# Patient Record
Sex: FEMALE | Race: WHITE | NOT HISPANIC OR LATINO | URBAN - METROPOLITAN AREA
[De-identification: names, ages, dates, MRNs, and addresses within clinical notes are randomized per-mention and may not be internally consistent; named-entity substitution may affect disease eponyms.]

---

## 2020-07-16 ENCOUNTER — OUTPATIENT (OUTPATIENT)
Dept: OUTPATIENT SERVICES | Facility: HOSPITAL | Age: 45
LOS: 1 days | Discharge: HOME | End: 2020-07-16

## 2020-07-16 DIAGNOSIS — I69.00 UNSPECIFIED SEQUELAE OF NONTRAUMATIC SUBARACHNOID HEMORRHAGE: ICD-10-CM

## 2020-07-16 DIAGNOSIS — I69.351 HEMIPLEGIA AND HEMIPARESIS FOLLOWING CEREBRAL INFARCTION AFFECTING RIGHT DOMINANT SIDE: ICD-10-CM

## 2020-07-16 DIAGNOSIS — R26.89 OTHER ABNORMALITIES OF GAIT AND MOBILITY: ICD-10-CM

## 2020-07-31 PROBLEM — Z00.00 ENCOUNTER FOR PREVENTIVE HEALTH EXAMINATION: Status: ACTIVE | Noted: 2020-07-31

## 2020-08-04 ENCOUNTER — APPOINTMENT (OUTPATIENT)
Dept: NEUROLOGY | Facility: CLINIC | Age: 45
End: 2020-08-04
Payer: COMMERCIAL

## 2020-08-04 VITALS
OXYGEN SATURATION: 98 % | TEMPERATURE: 98.4 F | WEIGHT: 202 LBS | BODY MASS INDEX: 37.17 KG/M2 | HEART RATE: 62 BPM | HEIGHT: 62 IN | DIASTOLIC BLOOD PRESSURE: 81 MMHG | SYSTOLIC BLOOD PRESSURE: 134 MMHG

## 2020-08-04 DIAGNOSIS — Z82.3 FAMILY HISTORY OF STROKE: ICD-10-CM

## 2020-08-04 DIAGNOSIS — Z82.49 FAMILY HISTORY OF ISCHEMIC HEART DISEASE AND OTHER DISEASES OF THE CIRCULATORY SYSTEM: ICD-10-CM

## 2020-08-04 DIAGNOSIS — Z78.9 OTHER SPECIFIED HEALTH STATUS: ICD-10-CM

## 2020-08-04 PROCEDURE — 99204 OFFICE O/P NEW MOD 45 MIN: CPT

## 2020-08-04 RX ORDER — ESCITALOPRAM OXALATE 10 MG/1
10 TABLET, FILM COATED ORAL
Refills: 0 | Status: ACTIVE | COMMUNITY

## 2020-08-04 RX ORDER — FLUTICASONE PROPIONATE AND SALMETEROL 50; 250 UG/1; UG/1
250-50 POWDER RESPIRATORY (INHALATION)
Refills: 0 | Status: ACTIVE | COMMUNITY

## 2020-08-04 RX ORDER — LABETALOL HYDROCHLORIDE 200 MG/1
200 TABLET, FILM COATED ORAL TWICE DAILY
Refills: 0 | Status: ACTIVE | COMMUNITY

## 2020-08-04 RX ORDER — TAMSULOSIN HYDROCHLORIDE 0.4 MG/1
0.4 CAPSULE ORAL
Refills: 0 | Status: DISCONTINUED | COMMUNITY

## 2020-08-04 NOTE — REVIEW OF SYSTEMS
[Recent Weight Loss (___ Lbs)] : recent [unfilled] ~Ulb weight loss [Difficulty with Language] : ~M difficulty with language [Facial Weakness] : facial weakness [Arm Weakness] : arm weakness [Hand Weakness] :  hand weakness [Leg Weakness] : leg weakness [Difficulty Walking] : difficulty walking [Frequent Falls] : frequent falls [Limping] : limping [Sleep Disturbances] : sleep disturbances [Eyesight Problems] : eyesight problems [Hot Flashes] : hot flashes [Muscle Weakness] : muscle weakness [Fever] : no fever [Chills] : no chills [Feeling Poorly] : not feeling poorly [Feeling Tired] : not feeling tired [Confused or Disoriented] : no confusion [Recent Weight Gain (___ Lbs)] : no recent weight gain [Memory Lapses or Loss] : no memory loss [Decr. Concentrating Ability] : no decrease in concentrating ability [Tingling] : no tingling [Numbness] : no numbness [Fainting] : no fainting [Dizziness] : no dizziness [Seizures] : no convulsions [Cluster Headache] : no cluster headache [Lightheadedness] : no lightheadedness [Vertigo] : no vertigo [Migraine Headache] : no migraine headache [Tension Headache] : no tension-type headache [Anxiety] : no anxiety [Suicidal] : not suicidal [Eye Pain] : no eye pain [Depression] : no depression [Heart Rate Is Slow] : the heart rate was not slow [Earache] : no earache [Loss Of Hearing] : no hearing loss [Heart Rate Is Fast] : the heart rate was not fast [Cough] : no cough [Shortness Of Breath] : no shortness of breath [Wheezing] : no wheezing [Vomiting] : no vomiting [Constipation] : no constipation [Abdominal Pain] : no abdominal pain [Diarrhea] : no diarrhea [Heartburn] : no heartburn [Melena] : no melena [Joint Pain] : no joint pain [Incontinence] : no incontinence [Dysuria] : no dysuria [Skin Lesions] : no skin lesions [Joint Swelling] : no joint swelling [Joint Stiffness] : no joint stiffness [Easy Bleeding] : no tendency for easy bleeding [Easy Bruising] : no tendency for easy bruising [Skin Wound] : no skin wound [de-identified] : only bruising when on heparin [FreeTextEntry3] : rigth side vision loss from stroke, improving

## 2020-08-04 NOTE — PHYSICAL EXAM
[FreeTextEntry1] : right lower quadrantanopsia.\par EOM's full.\par Paraphasic errors, moderate frequency.  Understands well.\par decrease LT and PP right F (75%), A, L (50%)\par circumducting gait.\par R lower facial droop, mild dysarthria, moderate paraphasias.\par Able to move right deltoid 2/5, lat dorsi pulls left arm back.\par No hand movement on right.  Increase gabriella, fingers and thumb flexed.\par RLE 4/5 hip flexion, knee flexion/extension.  R food is plantar flexed and increased tone.\par \par coordination intact on left, impaired on right .\par \par Lungs CTA bilaterally.\par Cor: RRR no murmurs.\par Abd: soft, NTND + BS.\par \par

## 2020-08-04 NOTE — HISTORY OF PRESENT ILLNESS
[FreeTextEntry1] : Pt is 43 yo RH female s/p stroke in February.  Wasn't seeing doctors, previously undiagnosed hypertension.  Mother states this was a hemorrhagic stroke (hypertensive bleed).  Pt was at granddaughters birthday party and started not to feel well.  She fell down, said please take care of me.  Nurse and doctor present, got ambulance.  Took her to [facility], transferred to [place] after saw the bleeding.  Was intubated and transferred.  Was in ICU for 2 weeks.  BP under control.  tracheostomy.  Long rehab stay.  March 13th transferred to [facility] in [facility].    Isolated her at [facility].  Covid + while at [facility], given hydroxychloroquine for a week, didn't feel ill at all.  Then stopped the rehab.  Transferred to [facility] on SI from April 6th until May 22nd.  Covid free now for a few months.  Rehab started at [facility].  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  Axienty makes speech worse.  Getting Speech, OT, and PT.  Walks with hemiwalker.  \par \par BP typically runs now 120's/70's-80's.\par \par Labetolol decreased from 3 times a day to twice a day.  Dr. Burns.\par DVT right calf put on heparin for a while.  Continue heparin while in [facility].  Venous doppler, below the knee , stable, stopped heparin.  Saw vascular specialist and told chronic clot.  \par \par Has lost 70 -75 lbs. \par Cholesterol is a little high 235 tot chol.  \par Is on low fat low carb diet.\par \par Walking a lot more in the house.\par Never a smoker.

## 2020-08-04 NOTE — ASSESSMENT
[FreeTextEntry1] : Left hypertensive ICH causing right hemiparesis, inferior right quadrantanopsia, and partial aphasia.  Patient has been making improvements with PT/OT.  Her speech has improved as well with occassional paraphasic errors that  worsen with anxiety level.  BP is under excellent control.\par \par Plan:\par 1.  Continue PT/OT and continue working on speech.\par 2.  Continue treatment of hypertensions.\par 3.  Continue falls precautions and use of hemiwalker.\par 4.  Obtain records from stroke hospitalization and forward to us to include in EMR.\par 5.  Return in 4 months.

## 2020-12-14 ENCOUNTER — APPOINTMENT (OUTPATIENT)
Dept: NEUROLOGY | Facility: CLINIC | Age: 45
End: 2020-12-14
Payer: COMMERCIAL

## 2020-12-14 VITALS
TEMPERATURE: 97.3 F | HEIGHT: 62 IN | HEART RATE: 65 BPM | OXYGEN SATURATION: 98 % | WEIGHT: 177 LBS | BODY MASS INDEX: 32.57 KG/M2 | DIASTOLIC BLOOD PRESSURE: 84 MMHG | SYSTOLIC BLOOD PRESSURE: 149 MMHG

## 2020-12-14 DIAGNOSIS — I61.9 NONTRAUMATIC INTRACEREBRAL HEMORRHAGE, UNSPECIFIED: ICD-10-CM

## 2020-12-14 PROCEDURE — 99072 ADDL SUPL MATRL&STAF TM PHE: CPT

## 2020-12-14 PROCEDURE — 99214 OFFICE O/P EST MOD 30 MIN: CPT

## 2020-12-14 NOTE — HISTORY OF PRESENT ILLNESS
[FreeTextEntry1] : Pt presents for f/u of ICH, hypertensive.  Now BP's typically running in 120's now.  Has white coat syndrome so this mornings reading is not typical for what most of readings are except in doctors office.\par Has had their cuff calibrated at doctos office.\par \par States no longer getting speech therapy, speech much improved.  Even in last few weeks.  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

## 2020-12-14 NOTE — ASSESSMENT
[FreeTextEntry1] : Patient s/p left hemisphere hypertensive ICH with right hemiparesis.\par She is making good improvements with PT/OT and has graduated from Speech therapy and now back to teaching  students remotely.\par \par Plan:\par 1.  Continue PT/OT and falls precautions.  May at some point benefit from antispasticity med and AFO.\par 2.  Continue teaching.\par 3.  F/u with PCP for management of hypertension.\par 4.  Return in 6 months.

## 2020-12-14 NOTE — PHYSICAL EXAM
[FreeTextEntry1] : Speech more fluent.  Good articulation.\par Right lower facial droop.\par Right hemiparesis.\par Right upper extremity weak ext > flexion and hand worse than proximally (thumb and fingers flexed).\par Right lower extremity 4/5 hip flexion, 5-/5 knee extension 4+/5 knee flexion.  DF 2/5\par \par Circumducting spastic gait.\par \par + tinels at left.

## 2020-12-15 ENCOUNTER — OUTPATIENT (OUTPATIENT)
Dept: OUTPATIENT SERVICES | Facility: HOSPITAL | Age: 45
LOS: 1 days | Discharge: HOME | End: 2020-12-15

## 2020-12-15 DIAGNOSIS — R26.89 OTHER ABNORMALITIES OF GAIT AND MOBILITY: ICD-10-CM

## 2020-12-15 DIAGNOSIS — I69.351 HEMIPLEGIA AND HEMIPARESIS FOLLOWING CEREBRAL INFARCTION AFFECTING RIGHT DOMINANT SIDE: ICD-10-CM

## 2020-12-15 DIAGNOSIS — I69.00 UNSPECIFIED SEQUELAE OF NONTRAUMATIC SUBARACHNOID HEMORRHAGE: ICD-10-CM

## 2021-03-18 ENCOUNTER — OUTPATIENT (OUTPATIENT)
Dept: OUTPATIENT SERVICES | Facility: HOSPITAL | Age: 46
LOS: 1 days | Discharge: HOME | End: 2021-03-18

## 2021-03-18 DIAGNOSIS — I69.351 HEMIPLEGIA AND HEMIPARESIS FOLLOWING CEREBRAL INFARCTION AFFECTING RIGHT DOMINANT SIDE: ICD-10-CM

## 2021-03-18 DIAGNOSIS — I69.00 UNSPECIFIED SEQUELAE OF NONTRAUMATIC SUBARACHNOID HEMORRHAGE: ICD-10-CM

## 2021-03-18 DIAGNOSIS — R26.89 OTHER ABNORMALITIES OF GAIT AND MOBILITY: ICD-10-CM

## 2021-03-24 ENCOUNTER — APPOINTMENT (OUTPATIENT)
Dept: NEUROLOGY | Facility: CLINIC | Age: 46
End: 2021-03-24
Payer: COMMERCIAL

## 2021-03-24 DIAGNOSIS — I10 ESSENTIAL (PRIMARY) HYPERTENSION: ICD-10-CM

## 2021-03-24 PROCEDURE — 99215 OFFICE O/P EST HI 40 MIN: CPT

## 2021-03-24 PROCEDURE — 99072 ADDL SUPL MATRL&STAF TM PHE: CPT

## 2021-04-04 NOTE — HISTORY OF PRESENT ILLNESS
[FreeTextEntry1] : Pt is 44 yo RH female presents to office as followup s/p hypertensive L ICH stroke in February 2020. Prior to stroke she wasn't seeing doctors, had previously undiagnosed hypertension. Pt was in ICU for 2 weeks, intubated, had her BP controlled, tracheostomy. Long rehab stay, transferred to [facility], was isolated due to testing + Covid (assymptomatic), given hydroxychloroquine for a week, then stopped the rehab. Transferred to [facility] on SI from April 6th until May 22nd. \par Covid free and living at home for a few months. Pt says she is doing very well, is mostly independent, still has trouble with fine motor skills like doing her hair, bathing. Lives in her own apartment 2 flights below her parents, who help her with some daily tasks. 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 \par \par Stroke affected speech, States no longer getting speech therapy, speech much improved. Still has some trouble with the speech, especially word finding, worse when she gets nervous. For teaching does not do any live classes, says she still wants to improve her speech prior to resuming her live online classes this coming summer. \par \marco Prior to stroke was an avid reader, now reading is difficult for her, finds she has to go back and re-read thjngs a few times berfore she can process it “ It is overwhelming.” Denies any vision issues that may be related to  this trouble in reading. \par \par Still getting PT 2x week, 1 hr each session, feels great improvement. Gets OT twice a week, feels less improvement, now taking a break but will resume. \par \par Walking a lot more in the house, uses damon-walker and cane in home, and damon when she goes out. Walks up 2 flighs of stairs twice each day to go up to her parents apartment where she has lunch and dinner.  \par \marco Originally had mood changes and bouts of crying, started on lexapro, saw PCP last month who said by next PCP visit she will reevaluate need to continue pt on lexapro. \par Pt says her mood in general is much improved \par \par Meds: \par Labetalol 200mg BID. Takes her BP regularly at home, says it is well-controlled. In office today BP is 110/68 and HR is 72. Rosuvastatin 10mg, last . Denies muscle pain or cramps\par \par Denies HA/ vision changes/ dizziness/ other new neurological complaints. \par \par HX DVT RLE, follows with vascular, told may be as result from hypercoaguable state following Covid.  \par Extensive eye exam by opthamoilogist last year was normal\par Sees PCP and cardiologist regularly. Plans to return to vascular MD to followup on DVT. \par \par Denies previous hx of sclot, denies hx of miscarriages\par stopped menstruating when she had her stroke\par Dneies hx of smoking, etoh, or recreationbal drug use\par \par Recd 2 covid vaccines- Jan was last \par

## 2021-04-04 NOTE — ASSESSMENT
[FreeTextEntry1] : Left ICH presumably secondary to HTN - Patient is living on her own in an apt in the same house as her parents. Working hard to stay independent and has had good recovery due to all the effort but now has persistent aphasia and spasticity s her main obstacles to increased functional independence. \par \par \par Plan: Botox for spasticity RLE and RUE \par Speech therapy referral \par Will need OT once Botox given\par Consider increasing Lexapro to 15 mg to see if benefits recovery.\par Will set up for AFO after Botox and restart PT. \par

## 2021-04-04 NOTE — PHYSICAL EXAM
[FreeTextEntry1] : \par \par \par \par R facial assymetry \par \par The patient is alert and oriented x3, naming intact x3, repetition normal, follows three-step commands, and is able to participate fully in the history taking.\par Speech is normal with no evidence of dysarthria.\par Memory is intact: Immediate recall 3 out of 3, short-term 3 out of 3, remote memory intact\par Cranial nerves II through XII intact except L gaze nystagmus, right UMN facial \par decreased sensation lower half right face\par Motor exam: stiff ricky R arm, stiff lower RLE with downward flexing toes , foot drop.\par decreased strength right leg\par Sensory exam: Decreased sensation RUE. sensation normal RLE  \par Coordination and vestibular exam: Finger to nose intact, no evidence of truncal or appendicular ataxia. No evidence of nystagmus. no vestibular symptoms elicited with head turning during ambulation.\par Gait: circumducting gait with hip hiking.\par Reflexes: One to 2+ in upper and lower extremities. No pathological reflexes. Downgoing toes.\par \par HEENT: Normocephalic atraumatic\par Funduscopic: No disc edema\par Neck supple with no JVD. No evidence of carotid bruit.\par Cardiac: S1-S2 regular rate and rhythm, no murmur noted.\par Chest: Clear to auscultation\par Abdomen: nontender, normal bowel sounds, soft\par Extremity: No edema, normal pulses.\par \par

## 2021-07-06 ENCOUNTER — APPOINTMENT (OUTPATIENT)
Dept: NEUROLOGY | Facility: CLINIC | Age: 46
End: 2021-07-06
Payer: COMMERCIAL

## 2021-07-06 VITALS
WEIGHT: 177 LBS | SYSTOLIC BLOOD PRESSURE: 133 MMHG | TEMPERATURE: 97.9 F | DIASTOLIC BLOOD PRESSURE: 80 MMHG | BODY MASS INDEX: 32.57 KG/M2 | HEIGHT: 62 IN | OXYGEN SATURATION: 98 % | HEART RATE: 64 BPM

## 2021-07-06 DIAGNOSIS — I69.320 APHASIA FOLLOWING CEREBRAL INFARCTION: ICD-10-CM

## 2021-07-06 PROCEDURE — 99214 OFFICE O/P EST MOD 30 MIN: CPT

## 2021-07-07 NOTE — HISTORY OF PRESENT ILLNESS
[FreeTextEntry1] : Pt is 46 yo f s/p ICH Feb 2020 causing right hemiparesis and aphasia.\par \par Has been using remote learning the past year.  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  States it was very good for her.   She would help plan the lessons K1-2.  States next year will be doing conflict resolution.  Principal states thinks this is best for her given her circumstance.\par \par She used PT/OT/Speech therapy for many months and would like to continue.\par \par Botox was suggested to her but she does not want it in RLE but is open to receiving it in her right hand. \par

## 2021-07-07 NOTE — PHYSICAL EXAM
[FreeTextEntry1] : MOCA score was 25/30,  4 of the points were due to minor language difficulties.  One point off for trouble repeating 5 digits forward.\par Right spastic hemiparesis.  \par She walks with circumducting gait.\par Right foot drop.  Wearing AFO.\par Right hand shows thumb in fist posture though pt able to passively straighten with other hand.

## 2021-09-01 ENCOUNTER — NON-APPOINTMENT (OUTPATIENT)
Age: 46
End: 2021-09-01

## 2021-09-01 ENCOUNTER — APPOINTMENT (OUTPATIENT)
Dept: NEUROLOGY | Facility: CLINIC | Age: 46
End: 2021-09-01
Payer: COMMERCIAL

## 2021-09-01 PROCEDURE — 64642 CHEMODENERV 1 EXTREMITY 1-4: CPT

## 2021-09-01 RX ORDER — ONABOTULINUMTOXINA 100 [USP'U]/1
100 INJECTION, POWDER, LYOPHILIZED, FOR SOLUTION INTRADERMAL; INTRAMUSCULAR
Qty: 1 | Refills: 0 | Status: COMPLETED | OUTPATIENT
Start: 2021-09-01

## 2021-09-01 RX ADMIN — ONABOTULINUMTOXINA 0 UNIT: 100 INJECTION, POWDER, LYOPHILIZED, FOR SOLUTION INTRADERMAL; INTRAMUSCULAR at 00:00

## 2021-09-01 NOTE — PROCEDURE
[FreeTextEntry1] : Botox injection [FreeTextEntry2] : post stroke dystonia right upper extremity [FreeTextEntry4] : none [FreeTextEntry3] : Procedure explained.  Risk of bleeding, infection, nerve pain, muscle weakness explained.  Consent obtained.  Next a 100 unit vial of onabotulinum toxin A, Lot # [id number], exp: 11/2023 was mixed with 2 cc of NS for a dilution of 5 units per 0.1 cc.  The following muscles were injected using EMG guidance:\par right FPL:   20 units divided over 4 locations\par right FDS:  40 units divided over 8 locations\par right FDP:  40 units divided over 8 locations\par ____________________________\par 100 units total botox injected.\par 0 units discarded.\par \par EBL: minimal\par Complications: none

## 2021-09-10 ENCOUNTER — APPOINTMENT (OUTPATIENT)
Age: 46
End: 2021-09-10
Payer: COMMERCIAL

## 2021-09-10 VITALS
WEIGHT: 153 LBS | BODY MASS INDEX: 28.16 KG/M2 | OXYGEN SATURATION: 67 % | SYSTOLIC BLOOD PRESSURE: 132 MMHG | DIASTOLIC BLOOD PRESSURE: 82 MMHG | HEART RATE: 97 BPM | HEIGHT: 62 IN

## 2021-09-10 DIAGNOSIS — Z86.73 PERSONAL HISTORY OF TRANSIENT ISCHEMIC ATTACK (TIA), AND CEREBRAL INFARCTION W/OUT RESIDUAL DEFICITS: ICD-10-CM

## 2021-09-10 DIAGNOSIS — E66.9 OBESITY, UNSPECIFIED: ICD-10-CM

## 2021-09-10 DIAGNOSIS — G47.33 OBSTRUCTIVE SLEEP APNEA (ADULT) (PEDIATRIC): ICD-10-CM

## 2021-09-10 PROCEDURE — 71046 X-RAY EXAM CHEST 2 VIEWS: CPT

## 2021-09-10 PROCEDURE — 99204 OFFICE O/P NEW MOD 45 MIN: CPT | Mod: 25

## 2021-09-10 NOTE — REASON FOR VISIT
[Initial] : an initial visit [Sleep Apnea] : sleep apnea [Obesity] : obesity [TextBox_44] : The patient was seen years ago for DAPHNE. She was on CPAP. About 2/20 pt suffered a severe stroke. She was intubated and then trached. She was transferred to a rehab facility right before COVID. She was positive at the facility but it is unclear if she had a COVID pneumonia or not. She lost 100 lbs and presents for evaluation. She is still using the CPAP and feels that it is relaxing.

## 2021-09-10 NOTE — ASSESSMENT
[FreeTextEntry1] : A:\par DAPHNE though the pt lost 100 lbs it is likely the DAPHNE remains. She will consider repeat split testing for new evaluation.\par Obesity\par \par PLAN:\par The patient is benefitting from the PAP device .\par New supplies ordered. Her old CPAP is >>5 years old and needs replacement. \par I stressed the need maintain compliance  with the PAP device.\par The patient is not to use an Ozone or UV sterilizer. \par F/U in 6 months\par \par She has a RESMED unit right now\par \par There was a long conversation with the patient and her mother who is in attendance.\par \par

## 2021-09-10 NOTE — HISTORY OF PRESENT ILLNESS
[Follow-Up - Routine Clinic] : a routine clinic follow-up of [Excess Weight] : excess weight [Weight Increase] : weight increase [Initial Evaluation - Existing Diagnosis] : an initial evaluation of an existing diagnosis of [None] : No associated symptoms are reported [CPAP] : CPAP [Good Compliance] : good compliance with treatment [Good Tolerance] : good tolerance of treatment [Good Symptom Control] : good symptom control [TextBox_4] : I reviewed all the previous sleep test that are available on file.\par I reviewed my previous office notes.\par \par I reviewed the neurology consultants notes.\par

## 2021-09-10 NOTE — PROCEDURE
[FreeTextEntry1] : \par CXR today in office demonstrates:\par b/l faint infiltrates at bases c/w prior pneumonia\par

## 2021-09-10 NOTE — PHYSICAL EXAM
[Normal Oropharynx] : normal oropharynx [Normal Appearance] : normal appearance [No Neck Mass] : no neck mass [Normal Rate/Rhythm] : normal rate/rhythm [Normal S1, S2] : normal s1, s2 [No Murmurs] : no murmurs [No Resp Distress] : no resp distress [Clear to Auscultation Bilaterally] : clear to auscultation bilaterally [No Abnormalities] : no abnormalities [Benign] : benign [Normal Gait] : normal gait [No Clubbing] : no clubbing [No Cyanosis] : no cyanosis [No Edema] : no edema [FROM] : FROM [Normal Color/ Pigmentation] : normal color/ pigmentation [No Focal Deficits] : no focal deficits [Oriented x3] : oriented x3 [Normal Affect] : normal affect [TextBox_2] : paretic r arm weak R LE

## 2021-10-06 PROBLEM — I10 ESSENTIAL HYPERTENSION: Status: ACTIVE | Noted: 2020-08-04

## 2021-12-17 ENCOUNTER — OUTPATIENT (OUTPATIENT)
Dept: OUTPATIENT SERVICES | Facility: HOSPITAL | Age: 46
LOS: 1 days | Discharge: HOME | End: 2021-12-17

## 2021-12-17 DIAGNOSIS — I69.351 HEMIPLEGIA AND HEMIPARESIS FOLLOWING CEREBRAL INFARCTION AFFECTING RIGHT DOMINANT SIDE: ICD-10-CM

## 2021-12-17 DIAGNOSIS — R26.89 OTHER ABNORMALITIES OF GAIT AND MOBILITY: ICD-10-CM

## 2021-12-17 DIAGNOSIS — I69.00 UNSPECIFIED SEQUELAE OF NONTRAUMATIC SUBARACHNOID HEMORRHAGE: ICD-10-CM

## 2022-01-05 ENCOUNTER — APPOINTMENT (OUTPATIENT)
Dept: NEUROLOGY | Facility: CLINIC | Age: 47
End: 2022-01-05
Payer: COMMERCIAL

## 2022-01-05 PROCEDURE — 64642 CHEMODENERV 1 EXTREMITY 1-4: CPT

## 2022-01-05 RX ADMIN — ONABOTULINUMTOXINA 0 UNIT: 100 INJECTION, POWDER, LYOPHILIZED, FOR SOLUTION INTRADERMAL; INTRAMUSCULAR at 00:00

## 2022-01-05 NOTE — PROCEDURE
[FreeTextEntry1] : Botox injection [FreeTextEntry2] : post stroke dystonia right upper extremity [FreeTextEntry4] : none [FreeTextEntry3] : Procedure explained.  Risk of bleeding, infection, nerve pain, muscle weakness explained.  Consent obtained.  Next a 100 unit vial of onabotulinum toxin A, Lot # [id number], exp: 02/2024 was mixed with 2 cc of NS for a dilution of 5 units per 0.1 cc.  The following muscles were injected using EMG guidance:\par right FPL:   30 units divided over 6 locations\par right FDS:  60 units divided over 6 locations\par right FDP:  60 units divided over 6 locations\par right PT:  50 units divided over 5 locations\par ____________________________\par 200 units total botox injected.\par 0 units discarded.\par \par EBL: minimal\par Complications: none

## 2022-03-31 ENCOUNTER — APPOINTMENT (OUTPATIENT)
Dept: NEUROLOGY | Facility: CLINIC | Age: 47
End: 2022-03-31
Payer: COMMERCIAL

## 2022-03-31 VITALS
BODY MASS INDEX: 29.81 KG/M2 | OXYGEN SATURATION: 97 % | HEART RATE: 61 BPM | SYSTOLIC BLOOD PRESSURE: 136 MMHG | DIASTOLIC BLOOD PRESSURE: 83 MMHG | HEIGHT: 62 IN | WEIGHT: 162 LBS

## 2022-03-31 PROCEDURE — 99214 OFFICE O/P EST MOD 30 MIN: CPT

## 2022-03-31 NOTE — ASSESSMENT
[FreeTextEntry1] : Right hemiparesis s/p left ICH.  Patient's blood pressure is well controlled now and she has had no new neurologic events.  She has made good progress with physical therapy and will put in another referral for continued PT.\par Botox seems to be helping so will continue that to optimize function in RUE.\par I discussed option of botox in right gastrocnemius to help foot be in a more neutral position but she is not ready for that.  I also discussed using spasticity medication like baclofen or tizanidine to help and she was open to trying that after school year ends.

## 2022-03-31 NOTE — HISTORY OF PRESENT ILLNESS
[FreeTextEntry1] : Pt presents for f/u of stroke and right hemiparesis. \par She had Botox in RUE for spasticity.  States she noticed it helps her lift her arm and she has increased ROM.\par (Botox amount was 200 units in January, the dose previously in September was 100 units). \par

## 2022-03-31 NOTE — PHYSICAL EXAM
[FreeTextEntry1] : Right facial droop, mild spastic dysarthria.\par Right hemiparesis. - able to abduct right arm to horizontal.\par still has right thumb in fist though I can overcome and straighten thumb and fingers out.\par RLE less affected but some spasticity present and foot in plantar flexion position.

## 2022-05-13 ENCOUNTER — APPOINTMENT (OUTPATIENT)
Dept: NEUROLOGY | Facility: CLINIC | Age: 47
End: 2022-05-13
Payer: COMMERCIAL

## 2022-05-13 PROCEDURE — 64642 CHEMODENERV 1 EXTREMITY 1-4: CPT

## 2022-05-13 RX ADMIN — ONABOTULINUMTOXINA 0 UNIT: 100 INJECTION, POWDER, LYOPHILIZED, FOR SOLUTION INTRADERMAL; INTRAMUSCULAR at 00:00

## 2022-05-13 NOTE — PROCEDURE
[FreeTextEntry1] : Botox injection [FreeTextEntry2] : post stroke dystonia right upper extremity [FreeTextEntry4] : none [FreeTextEntry3] : Procedure explained.  Risk of bleeding, infection, nerve pain, muscle weakness explained.  Consent obtained.  Patient reports she had good results with the last Botox injections and wishes to continue the same amount today.  Next a 100 unit vial of onabotulinum toxin A, Lot # [id number], exp: 2024/05 and another 100 unit vial, Lot # [id number], expt 2024/05 were mixed with 2 cc of NS each for a dilution of 5 units per 0.1 cc.  \par \par The following muscles were injected using EMG guidance with 26g, 37 mm needle:\par right FPL:   30 units divided over 6 locations\par right FDS:  60 units divided over 6 locations\par right FDP:  60 units divided over 6 locations\par right PT:     50 units divided over 5 locations\par ____________________________\par 200 units total botox injected.\par 0 units discarded.\par \par EBL: minimal\par Complications: none

## 2022-08-24 ENCOUNTER — APPOINTMENT (OUTPATIENT)
Dept: NEUROLOGY | Facility: CLINIC | Age: 47
End: 2022-08-24

## 2022-08-24 PROCEDURE — 64642 CHEMODENERV 1 EXTREMITY 1-4: CPT

## 2022-08-24 RX ORDER — ONABOTULINUMTOXINA 100 [USP'U]/1
100 INJECTION, POWDER, LYOPHILIZED, FOR SOLUTION INTRADERMAL; INTRAMUSCULAR
Qty: 1 | Refills: 0 | Status: COMPLETED | OUTPATIENT
Start: 2022-08-24

## 2022-08-24 RX ADMIN — ONABOTULINUMTOXINA 0 UNIT: 100 INJECTION, POWDER, LYOPHILIZED, FOR SOLUTION INTRADERMAL; INTRAMUSCULAR at 00:00

## 2022-08-24 NOTE — PROCEDURE
[FreeTextEntry1] : Botox injection [FreeTextEntry2] : post stroke dystonia right upper extremity [FreeTextEntry4] : none [FreeTextEntry3] : Procedure explained.  Risk of bleeding, infection, nerve pain, muscle weakness explained.  Consent obtained.  Patient reports she did not have as good results last injection as she had the time before last.   She is willing to try different distribution of botox today.  Next a 100 unit vial of onabotulinum toxin A, Lot # [id number], exp: 2024/06 and another 100 unit vial, Lot # [id number], expt 2024/06 were mixed with 2 cc of NS each for a dilution of 5 units per 0.1 cc.  \par \par The following muscles were injected using EMG guidance with 27g, 25 mm needle:\par right FPL:   40 units divided over 8 locations\par right FDS:  70 units divided over 7 locations\par right FDP:  70 units divided over 7 locations\par right PT:     20 units divided over 4 locations\par ____________________________\par 200 units total botox injected.\par 0 units discarded.\par \par EBL: minimal\par Complications: none

## 2022-09-28 DIAGNOSIS — R26.81 UNSTEADINESS ON FEET: ICD-10-CM

## 2022-09-28 DIAGNOSIS — G81.91 HEMIPLEGIA, UNSPECIFIED AFFECTING RIGHT DOMINANT SIDE: ICD-10-CM

## 2023-01-25 ENCOUNTER — APPOINTMENT (OUTPATIENT)
Dept: NEUROLOGY | Facility: CLINIC | Age: 48
End: 2023-01-25

## 2023-03-27 ENCOUNTER — NON-APPOINTMENT (OUTPATIENT)
Age: 48
End: 2023-03-27

## 2023-04-16 ENCOUNTER — EMERGENCY (EMERGENCY)
Facility: HOSPITAL | Age: 48
LOS: 0 days | Discharge: HOME | End: 2023-04-16
Attending: EMERGENCY MEDICINE
Payer: COMMERCIAL

## 2023-04-16 VITALS
OXYGEN SATURATION: 100 % | HEIGHT: 62 IN | DIASTOLIC BLOOD PRESSURE: 85 MMHG | RESPIRATION RATE: 18 BRPM | HEART RATE: 60 BPM | TEMPERATURE: 97 F | WEIGHT: 199.96 LBS | SYSTOLIC BLOOD PRESSURE: 177 MMHG

## 2023-04-16 DIAGNOSIS — S42.301A UNSPECIFIED FRACTURE OF SHAFT OF HUMERUS, RIGHT ARM, INITIAL ENCOUNTER FOR CLOSED FRACTURE: ICD-10-CM
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
## 2023-04-16 DIAGNOSIS — I69.351 HEMIPLEGIA AND HEMIPARESIS FOLLOWING CEREBRAL INFARCTION AFFECTING RIGHT DOMINANT SIDE: ICD-10-CM

## 2023-04-16 DIAGNOSIS — I10 ESSENTIAL (PRIMARY) HYPERTENSION: ICD-10-CM

## 2023-04-16 DIAGNOSIS — M25.521 PAIN IN RIGHT ELBOW: ICD-10-CM

## 2023-04-16 DIAGNOSIS — R07.89 OTHER CHEST PAIN: ICD-10-CM
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
## 2023-04-16 DIAGNOSIS — E78.5 HYPERLIPIDEMIA, UNSPECIFIED: ICD-10-CM

## 2023-04-16 PROCEDURE — 73080 X-RAY EXAM OF ELBOW: CPT | Mod: 26,RT

## 2023-04-16 PROCEDURE — 71045 X-RAY EXAM CHEST 1 VIEW: CPT

## 2023-04-16 PROCEDURE — 99284 EMERGENCY DEPT VISIT MOD MDM: CPT | Mod: 25

## 2023-04-16 PROCEDURE — 71250 CT THORAX DX C-: CPT | Mod: 26,MA

## 2023-04-16 PROCEDURE — 73090 X-RAY EXAM OF FOREARM: CPT | Mod: RT

## 2023-04-16 PROCEDURE — 71250 CT THORAX DX C-: CPT | Mod: MA

## 2023-04-16 PROCEDURE — 73060 X-RAY EXAM OF HUMERUS: CPT | Mod: 26,RT

## 2023-04-16 PROCEDURE — 73090 X-RAY EXAM OF FOREARM: CPT | Mod: 26,RT

## 2023-04-16 PROCEDURE — 73060 X-RAY EXAM OF HUMERUS: CPT | Mod: RT

## 2023-04-16 PROCEDURE — 73030 X-RAY EXAM OF SHOULDER: CPT | Mod: RT

## 2023-04-16 PROCEDURE — 73110 X-RAY EXAM OF WRIST: CPT | Mod: 26,RT

## 2023-04-16 PROCEDURE — 73110 X-RAY EXAM OF WRIST: CPT | Mod: RT

## 2023-04-16 PROCEDURE — 71045 X-RAY EXAM CHEST 1 VIEW: CPT | Mod: 26

## 2023-04-16 PROCEDURE — 73080 X-RAY EXAM OF ELBOW: CPT | Mod: RT

## 2023-04-16 PROCEDURE — 73030 X-RAY EXAM OF SHOULDER: CPT | Mod: 26,RT

## 2023-04-16 PROCEDURE — 99284 EMERGENCY DEPT VISIT MOD MDM: CPT

## 2023-04-16 RX ORDER — ACETAMINOPHEN 500 MG
650 TABLET ORAL ONCE
Refills: 0 | Status: COMPLETED | OUTPATIENT
Start: 2023-04-16 | End: 2023-04-16

## 2023-04-16 RX ADMIN — Medication 650 MILLIGRAM(S): at 19:10

## 2023-04-16 NOTE — ED ADULT TRIAGE NOTE - CHIEF COMPLAINT QUOTE
Patient s/p mechanical fall- complaining of right elbow pain- pt has history of a stroke and has right sided weakness

## 2023-04-16 NOTE — ED ADULT NURSE NOTE - OBJECTIVE STATEMENT
47 year old female complaining of RUE pain s/p trip and fall today. Pt states she had a stroke 3 years ago and has right sided weakness at baseline. Pt denies LOC, HT

## 2023-04-16 NOTE — ED PROVIDER NOTE - ATTENDING CONTRIBUTION TO CARE
Patient is status post mechanical fall in the right side which she has chronic weakness from CVA.  Imaging demonstrates a midshaft humerus fracture pending CT chest to rule out intrathoracic injury. Agree with above exam with the exception that the patient also has pain to the right humerus with range of motion of the right elbow.

## 2023-04-16 NOTE — ED PROVIDER NOTE - PHYSICAL EXAMINATION
CONSTITUTIONAL: well-appearing, in NAD  SKIN: Warm dry, normal skin turgor  HEAD: NCAT  EYES: EOMI, PERRLA, no scleral icterus, conjunctiva pink  ENT: normal pharynx with no erythema or exudates  NECK: Supple; non tender. Full ROM.  CARD: RRR, no murmurs.  RESP: clear to ausculation b/l. No crackles or wheezing.  ABD: soft, non-tender, non-distended, no rebound or guarding.  EXT: Full ROM, no bony tenderness, no pedal edema, no calf tenderness; R elbow tender  NEURO: moving all extremities; decreased R sided strength/sensation, baseline  PSYCH: Cooperative, appropriate.

## 2023-04-16 NOTE — ED PROVIDER NOTE - CLINICAL SUMMARY MEDICAL DECISION MAKING FREE TEXT BOX
Endorsed from Dr. Mejia  Pt w/ hx of hemorhagic stroke w/ residual R sided weakness  pt w/ fall w/ R arm injury and chest wall pain  XR shows mid shaft humerus fracture (placed in splint)  Pending CT chest for further deliniation of trauma    Post-splint XR w/o perfect alignment but adequate  Given f/u w/ ortho  Chest CT w/o acute injury  Discussed w/ patient\  stable for d/c home

## 2023-04-16 NOTE — ED PROVIDER NOTE - PROGRESS NOTE DETAILS
BK: Patient with R midshaft humeral fracture. Will get noncon CT chest before reassessment. s/o Dr. Corbett f/u ct chest and re-eval Sh  Discussed results with pt  plan to dc fu with ortho outpatient   pt agrees with plan  strict ed return precautions given

## 2023-04-16 NOTE — ED PROVIDER NOTE - PATIENT PORTAL LINK FT
You can access the FollowMyHealth Patient Portal offered by [facility] by registering at the following website: [email or web address]. By joining [employer]’s FollowMyHealth portal, you will also be able to view your health information using other applications (apps) compatible with our system.

## 2023-04-16 NOTE — ED ADULT NURSE NOTE - NS_NURSE_DISC_TEACHING_YN_ED_ALL_ED
Pt. discharged home, a/ox3, in NAD. Discharge instructions reviewed with patient. Pt. verbalized understanding of plan of care. ROBIN Gaona RN.     Yes

## 2023-04-16 NOTE — ED PROVIDER NOTE - NSFOLLOWUPINSTRUCTIONS_ED_ALL_ED_FT
Our Emergency Department Referral Coordinators will be reaching out to you in the next 24-48 hours from 9:00am to 5:00pm with a follow up appointment with orthopedics. Please expect a phone call from the hospital in that time frame. If you do not receive a call or if you have any questions or concerns, you can reach them at   [phone]    Humerus Fracture Treated With Immobilization  Right arm and hand showing skeleton with a humerus fracture.  A humerus fracture is a break in the large bone in the upper arm (humerus). If the joint is stable and the bones are still in their normal position (nondisplaced), the injury may be treated with immobilization. This involves the use of a cast, splint, or sling to hold your arm in place. Immobilization ensures that your bones continue to stay in the correct position while your arm is healing.    What are the causes?  This condition may be caused by:  A fall.  A hard, direct hit to the arm.  A motor vehicle accident.  What increases the risk?  The following factors may make you more likely to develop this condition:  Having a disease that makes the bones thin and weak.  Being elderly.  What are the signs or symptoms?  Symptoms of this condition include:  Pain.  Swelling.  Bruising.  Not being able to move your arm normally.  How is this diagnosed?  This condition may be diagnosed based on:  A physical exam.  X-rays of your upper arm, elbow, and shoulder.  CT scan.  How is this treated?  Treatment for this condition involves wearing a cast, splint, or sling until the injured area is stable enough for you to begin range-of-motion exercises. You may also be prescribed pain medicine.    Follow these instructions at home:  If you have a nonremovable cast or splint:    Do not put pressure on any part of the cast or splint until it is fully hardened. This may take several hours.  Do not stick anything inside the cast or splint to scratch your skin. Doing that increases your risk of infection.  Check the skin around the cast or splint every day. Tell your health care provider about any concerns.  You may put lotion on dry skin around the edges of the cast or splint. Do not put lotion on the skin underneath the cast or splint.  Keep the cast or splint clean and dry.  If you have a removable splint or sling:    Wear the splint or sling as told by your health care provider. Remove it only as told by your health care provider.  Check the skin around the splint or sling every day. Tell your health care provider about any concerns.  Loosen the splint or sling if your fingers tingle, become numb, or turn cold and blue.  Keep the splint or sling clean and dry.  Bathing    Do not take baths, swim, or use a hot tub until your health care provider approves. Ask your health care provider if you may take showers. You may only be allowed to take sponge baths.  If the cast, splint, or sling is not waterproof:  Do not let it get wet.  Cover it with a watertight covering when you take a bath or shower.  Managing pain, stiffness, and swelling    Bag of ice on a towel on the skin.   If directed, put ice on the injured area. To do this:  If you have a removable splint or sling, remove it as told by your health care provider.  Put ice in a plastic bag.  Place a towel between your skin and the bag or between your nonremovable cast or sling and the bag.  Leave the ice on for 20 minutes, 2–3 times a day.  Remove the ice if your skin turns bright red. This is very important. If you cannot feel pain, heat, or cold, you have a greater risk of damage to the area.  Move your fingers often to reduce stiffness and swelling.  Raise the injured area above the level of your heart while you are sitting or lying down.  Driving    Do not drive or operate machinery while taking prescription pain medicine.  Ask your health care provider when it is safe to drive if you have a cast, splint, or sling on your arm.  Activity    Do not lift anything until your health care provider says that it is safe.  Return to your normal activities as told by your health care provider. Ask your health care provider what activities are safe for you.  Do range-of-motion exercises only as told by your health care provider or physical therapist.  General instructions    Do not use any products that contain nicotine or tobacco. These products include cigarettes, chewing tobacco, and vaping devices, such as e-cigarettes. These can delay bone healing. If you need help quitting, ask your health care provider.  Take over-the-counter and prescription medicines only as told by your health care provider.  Ask your health care provider if the medicine prescribed to you:  Can cause constipation. You may need to take these actions to prevent or treat constipation:  Drink enough fluid to keep your urine pale yellow.  Take over-the-counter or prescription medicines.  Eat foods that are high in fiber, such as beans, whole grains, and fresh fruits and vegetables.  Limit foods that are high in fat and processed sugars, such as fried or sweet foods.  Keep all follow-up visits. This is important.  Contact a health care provider if:  You have any new pain, swelling, or bruising.  Your pain, swelling, and bruising do not improve.  Your cast, splint, or sling becomes loose or damaged.  Get help right away if:  Your skin or fingers on your injured arm turn blue or gray.  Your arm feels cold or numb.  You have severe pain in your injured arm.  Summary  A humerus fracture is a break in the large bone in the upper arm.  Immobilization involves the use of a cast, splint, or sling to hold your arm in place while the injury heals.  Wear a splint or sling as told by your health care provider. Remove it only as told by your health care provider.  Move your fingers often to reduce stiffness and swelling.  This information is not intended to replace advice given to you by your health care provider. Make sure you discuss any questions you have with your health care provider.

## 2023-04-16 NOTE — ED PROVIDER NOTE - OBJECTIVE STATEMENT
39-year-old female with past medical history of hypertension hyperlipidemia CVA with residual right-sided weakness presenting for mechanical fall that happened at 3 PM.  Patient states [place] at the mall with her [place] when she slipped fell on her right side hitting her right arm.  Patient endorses mild pain into the arm but states that she not able to feel the arm very well.  Patient also endorses pain to right chest wall.  No head trauma loss of consciousness anticoagulation numbness weakness tingling headache vision changes dizziness chest pain shortness of breath nausea vomiting diarrhea dysuria fever.

## 2023-04-16 NOTE — ED ADULT NURSE NOTE - NSIMPLEMENTINTERV_GEN_ALL_ED
Implemented All Fall Risk Interventions:  [place] to call system. Call bell, personal items and telephone within reach. Instruct patient to call for assistance. Room bathroom lighting operational. Non-slip footwear when patient is off stretcher. Physically safe environment: no spills, clutter or unnecessary equipment. Stretcher in lowest position, wheels locked, appropriate side rails in place. Provide visual cue, wrist band, yellow gown, etc. Monitor gait and stability. Monitor for mental status changes and reorient to person, place, and time. Review medications for side effects contributing to fall risk. Reinforce activity limits and safety measures with patient and family.

## 2023-04-19 ENCOUNTER — NON-APPOINTMENT (OUTPATIENT)
Age: 48
End: 2023-04-19

## 2023-04-19 ENCOUNTER — APPOINTMENT (OUTPATIENT)
Dept: ORTHOPEDIC SURGERY | Facility: CLINIC | Age: 48
End: 2023-04-19
Payer: COMMERCIAL

## 2023-04-19 VITALS — WEIGHT: 180 LBS | HEIGHT: 62 IN | BODY MASS INDEX: 33.13 KG/M2

## 2023-04-19 DIAGNOSIS — S42.355P: ICD-10-CM

## 2023-04-19 DIAGNOSIS — S42.351A DISPLACED COMMINUTED FRACTURE OF SHAFT OF HUMERUS, RIGHT ARM, INITIAL ENCOUNTER FOR CLOSED FRACTURE: ICD-10-CM

## 2023-04-19 PROCEDURE — 99203 OFFICE O/P NEW LOW 30 MIN: CPT

## 2023-04-19 NOTE — HISTORY OF PRESENT ILLNESS
[de-identified] :  patient is a 47-year-old female presenting today for acute right humeral shaft fracture.  She has a past medical history of a CVA resulting in paralysis of the right side.  On the 16th April 2023 she was walking on an uneven floor when she fell.  She was seen at [facility] where x-rays were taken confirming a right humeral shaft fracture.  She is coming in today for repeat evaluation

## 2023-04-19 NOTE — ASSESSMENT
[FreeTextEntry1] : Reviewed x-ray findings with patient and her mother confirming a comminuted displaced right humeral shaft fracture.  Patient was placed in a coaptation brace and sling.  She will follow-up in 2 weeks with Dr. Ricci. 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 Banuelos brace ordered. \par \par Reinforced the importance of vitamin D and calcium supplementation once daily.\par \par She will continue with OTC analgesics as needed for pain.  She was given a note clearing her to return to work on Monday, 24 April 2023.  If she needs an extension of this note she will notify the office of soon as possible.\par \par She expressed understanding of outlined care plan and had no additional questions or concerns\par \par This visit was seen under the direct supervision of Dr. Fernie Rose

## 2023-04-19 NOTE — DATA REVIEWED
[Right] : of the right [Report was reviewed and noted in the chart] : The report was reviewed and noted in the chart [FreeTextEntry1] : comminuted displaced fracture of the mid right humeral shaft. Proximal right humerus appears to be intact. No evidence of dislocation of the right shoulder joint.\par comminuted displaced fracture of the mid right humeral shaft. Proximal right humerus appears to be intact. No evidence of dislocation of the right shoulder joint.\par

## 2023-04-19 NOTE — PHYSICAL EXAM
[Right] : right shoulder [de-identified] : General appearance the patient is unremarkable, well developed, well nourished, well groomed with no deformities.  Patient is alert and oriented.  Patient is able to communicate clearly.  Visible skin on the head, face, right upper extremity, left upper extremity and bilateral lower extremities are normal showing no rashes, lesions or ulcers.  Normal peripheral vascular system.  Normal coordination, mood and affect.  [] : motor and sensory not intact distally [FreeTextEntry8] : Tenderness to palpation noted along the humeral shaft [FreeTextEntry9] : Range of motion and strength testing deferred due to known fracture [de-identified] : Chronic issue secondary to stroke

## 2023-05-08 ENCOUNTER — RESULT CHARGE (OUTPATIENT)
Age: 48
End: 2023-05-08

## 2023-05-08 ENCOUNTER — APPOINTMENT (OUTPATIENT)
Dept: ORTHOPEDIC SURGERY | Facility: CLINIC | Age: 48
End: 2023-05-08
Payer: COMMERCIAL

## 2023-05-08 ENCOUNTER — NON-APPOINTMENT (OUTPATIENT)
Age: 48
End: 2023-05-08

## 2023-05-08 PROCEDURE — 24500 CLTX HUMRL SHFT FX W/O MNPJ: CPT | Mod: RT

## 2023-05-08 PROCEDURE — 73060 X-RAY EXAM OF HUMERUS: CPT | Mod: RT

## 2023-05-08 PROCEDURE — 99214 OFFICE O/P EST MOD 30 MIN: CPT | Mod: 57

## 2023-05-08 NOTE — DISCUSSION/SUMMARY
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  She had a fall and a midshaft humerus fracture approximately 3 weeks ago.  She has been treated in a Banuelos brace.  Previous to this she could move her right upper extremity away from her body and can sometimes flex her elbow about 80 degrees but her right hand really was quite contracted into flexion.  When she fell she also hurt her right wrist and was given a cock up wrist splint as x-rays from the hospital which I have also reviewed showed some disruption of the DRUJ and some scapholunate widening.  Exam today of the right wrist really does not show any acute swelling or ecchymosis or tenderness (she does have limited sensation but she does not general feel pain) and I do think that her x-ray changes were probably old\par \par The Banuelos brace has slid down quite a bit but the good news is that the x-ray does show a fair amount of callus and a well opposed fracture and clinically there is no gross motion at the fracture and clinically she is tolerating well being out of the Banuelos and out of the sling here in the office.  She is going to use the sling when she is at work so people one hit her arm.  She is going to take calcium and vitamin D and we did send her for physical therapy for adaptive aids and occupational therapy for adaptive aids and pendulum and Codman exercises which we also demonstrated to her here

## 2023-06-12 ENCOUNTER — APPOINTMENT (OUTPATIENT)
Dept: ORTHOPEDIC SURGERY | Facility: CLINIC | Age: 48
End: 2023-06-12
Payer: COMMERCIAL

## 2023-06-12 ENCOUNTER — RESULT CHARGE (OUTPATIENT)
Age: 48
End: 2023-06-12

## 2023-06-12 PROCEDURE — 73060 X-RAY EXAM OF HUMERUS: CPT | Mod: RT

## 2023-06-12 PROCEDURE — 99024 POSTOP FOLLOW-UP VISIT: CPT

## 2023-07-07 ENCOUNTER — APPOINTMENT (OUTPATIENT)
Dept: PAIN MANAGEMENT | Facility: CLINIC | Age: 48
End: 2023-07-07

## 2023-07-07 VITALS — BODY MASS INDEX: 33.13 KG/M2 | WEIGHT: 180 LBS | HEIGHT: 62 IN

## 2023-07-21 ENCOUNTER — NON-APPOINTMENT (OUTPATIENT)
Age: 48
End: 2023-07-21

## 2023-07-21 ENCOUNTER — APPOINTMENT (OUTPATIENT)
Dept: NEUROLOGY | Facility: CLINIC | Age: 48
End: 2023-07-21
Payer: COMMERCIAL

## 2023-07-21 VITALS
HEIGHT: 62 IN | HEART RATE: 61 BPM | OXYGEN SATURATION: 98 % | SYSTOLIC BLOOD PRESSURE: 137 MMHG | BODY MASS INDEX: 36.8 KG/M2 | TEMPERATURE: 97.4 F | DIASTOLIC BLOOD PRESSURE: 82 MMHG | WEIGHT: 200 LBS

## 2023-07-21 DIAGNOSIS — G24.8 OTHER DYSTONIA: ICD-10-CM

## 2023-07-21 PROCEDURE — 99215 OFFICE O/P EST HI 40 MIN: CPT

## 2023-07-21 RX ORDER — ROSUVASTATIN CALCIUM 10 MG/1
10 TABLET, FILM COATED ORAL
Refills: 0 | Status: ACTIVE | COMMUNITY

## 2023-07-21 NOTE — PHYSICAL EXAM
[General Appearance - Alert] : alert [General Appearance - In No Acute Distress] : in no acute distress [General Appearance - Well Nourished] : well nourished [General Appearance - Well Developed] : well developed [Oriented To Time, Place, And Person] : oriented to person, place, and time [Affect] : the affect was normal [Person] : oriented to person [Place] : oriented to place [Time] : oriented to time [Naming Objects] : no difficulty naming common objects [Fluency] : fluency intact [Comprehension] : comprehension intact [Cranial Nerves Oculomotor (III)] : extraocular motion intact [Cranial Nerves Vestibulocochlear (VIII)] : hearing was intact bilaterally [Cranial Nerves Glossopharyngeal (IX)] : tongue and palate midline [Cranial Nerves Hypoglossal (XII)] : there was no tongue deviation with protrusion [Hemianopsia Homonymous Right] : right homonymous hemianopsia present [Quadrantanopsia] : quadrantanopsia present [Cranial Nerves Facial Central - Right Only] : central 7th nerve weakness [Coordination - Dysmetria Impaired Finger-to-Nose Left Only] : not present on the left side [3+] : Patella right 3+ [2+] : Patella left 2+ [FreeTextEntry4] : mild dysarthria [FreeTextEntry6] : increased tone in RUE>RLE. RUE 3/5, RLE 4/5\par LUE and LLE 5/5 [FreeTextEntry7] : decreased light touch in RUE otherwise itnact [FreeTextEntry8] : wide based, uses cane

## 2023-07-21 NOTE — HISTORY OF PRESENT ILLNESS
[FreeTextEntry1] : Pt is a 46 yo F with PMHx of HTN, DAPHNE, h/o RLE DVT, covid complicated by trach s/p reversal L BG IPH with aphasia and right hemiplegia 02/2020 (at [facility]), who presents today with her mother for follow up. Pt endorses doing well overall. 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 She fell last April, sustained a right humerus fracture. Now uses a cane when ambulating outside. She returned to work as a teacher. Mild dysarthria and occasional word finding difficulty, denies dysphagia. She has not had botox in over a year since Dr. Molina left. Endorses increased spasticity in her RUE since her fracture. Denies falls.

## 2023-09-28 ENCOUNTER — APPOINTMENT (OUTPATIENT)
Dept: NEUROLOGY | Facility: CLINIC | Age: 48
End: 2023-09-28

## 2023-10-22 ENCOUNTER — NON-APPOINTMENT (OUTPATIENT)
Age: 48
End: 2023-10-22

## 2023-10-23 ENCOUNTER — APPOINTMENT (OUTPATIENT)
Dept: ORTHOPEDIC SURGERY | Facility: CLINIC | Age: 48
End: 2023-10-23
Payer: COMMERCIAL

## 2023-10-23 VITALS — HEIGHT: 61 IN | BODY MASS INDEX: 39.65 KG/M2 | WEIGHT: 210 LBS

## 2023-10-23 DIAGNOSIS — S89.91XA UNSPECIFIED INJURY OF RIGHT LOWER LEG, INITIAL ENCOUNTER: ICD-10-CM

## 2023-10-23 DIAGNOSIS — S99.921A UNSPECIFIED INJURY OF RIGHT FOOT, INITIAL ENCOUNTER: ICD-10-CM

## 2023-10-23 PROCEDURE — 73630 X-RAY EXAM OF FOOT: CPT | Mod: RT

## 2023-10-23 PROCEDURE — 99203 OFFICE O/P NEW LOW 30 MIN: CPT

## 2023-10-23 PROCEDURE — 73562 X-RAY EXAM OF KNEE 3: CPT | Mod: RT

## 2023-12-08 ENCOUNTER — APPOINTMENT (OUTPATIENT)
Dept: ORTHOPEDIC SURGERY | Facility: CLINIC | Age: 48
End: 2023-12-08

## 2023-12-14 ENCOUNTER — APPOINTMENT (OUTPATIENT)
Dept: NEUROLOGY | Facility: CLINIC | Age: 48
End: 2023-12-14
Payer: COMMERCIAL

## 2023-12-14 VITALS
SYSTOLIC BLOOD PRESSURE: 128 MMHG | WEIGHT: 215 LBS | HEART RATE: 70 BPM | DIASTOLIC BLOOD PRESSURE: 79 MMHG | BODY MASS INDEX: 40.59 KG/M2 | HEIGHT: 61 IN | OXYGEN SATURATION: 98 % | TEMPERATURE: 97.4 F

## 2023-12-14 DIAGNOSIS — G81.11 SPASTIC HEMIPLEGIA AFFECTING RIGHT DOMINANT SIDE: ICD-10-CM

## 2023-12-14 PROCEDURE — 99214 OFFICE O/P EST MOD 30 MIN: CPT

## 2023-12-14 RX ORDER — BACLOFEN 5 MG/1
5 TABLET ORAL
Qty: 60 | Refills: 3 | Status: ACTIVE | COMMUNITY
Start: 2023-07-21 | End: 1900-01-01

## 2023-12-14 NOTE — REVIEW OF SYSTEMS
[As Noted in HPI] : as noted in HPI [Depression] : depression [Eyesight Problems] : eyesight problems [Negative] : Cardiovascular

## 2023-12-14 NOTE — HISTORY OF PRESENT ILLNESS
[FreeTextEntry1] : Interval History: Pt presents today with her mother for f/u. She had another fall this year where she strained her right leg. Fell/tripped in the bathroom, denies LOC or head trauma. She has been working with PT and RLE is getting stronger. She continued to take baclofen 5mg daily, no rashes or side effects, but is usure how helpful it is. Denies RLE "buckling" or "giving out" on her. has an upcoming appt with neuro-ophthalmology.   HPI:  Pt is a 46 yo F with PMHx of HTN, DAPHNE, h/o RLE DVT, covid complicated by trach s/p reversal L  IPH with aphasia and right hemiplegia 02/2020 (at [facility]), who presents today with her mother for follow up. Pt endorses doing well overall. 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 She fell last April, sustained a right humerus fracture. Now uses a cane when ambulating outside. She returned to work as a teacher. Mild dysarthria and occasional word finding difficulty, denies dysphagia. She has not had botox in over a year since Dr. Molina left. Endorses increased spasticity in her RUE since her fracture. Denies falls.  8/2023: Pt developed rash with baclofen. Discussed slowly tapering off (decrease to 5mg once daily x 1 week then d/c). She has an appt for botox for spasticity in 02/2024.

## 2023-12-14 NOTE — DISCUSSION/SUMMARY
[FreeTextEntry1] : Pt is a 49 yo F with PMHx of HTN, DAPHNE, h/o RLE DVT, covid complicated by trach s/p reversal L BG IPH with aphasia and right hemiplegia 02/2020 (at [facility]), who presents today with her mother for follow up.  # L BG IPH with residual right spastic hemiplegia and dysarthria: 02/2020, reviewed outside records; likely 2/2 HTN and covid infection. NIHSS 6. mRS 1 (has not returned to driving, otherwise able to do all ADL's independently and back to work). - Continue HTN optimization, SBP goal <140 - CUS 50-69%  b/l and retrograde flow in L VA. Obtain MRA head/neck - MRI brain without - Continue baclofen 5mg 1-2 x daily for spasticity - Botox referral to Dr. Gilbert   RTC in 6 mo.

## 2023-12-14 NOTE — PHYSICAL EXAM
[General Appearance - Alert] : alert [General Appearance - In No Acute Distress] : in no acute distress [General Appearance - Well Nourished] : well nourished [General Appearance - Well Developed] : well developed [Oriented To Time, Place, And Person] : oriented to person, place, and time [FreeTextEntry1] : tearful at times [Person] : oriented to person [Place] : oriented to place [Time] : oriented to time [Cranial Nerves Oculomotor (III)] : extraocular motion intact [Cranial Nerves Vestibulocochlear (VIII)] : hearing was intact bilaterally [Cranial Nerves Hypoglossal (XII)] : there was no tongue deviation with protrusion [Hemianopsia Homonymous Right] : right homonymous hemianopsia present [Quadrantanopsia] : quadrantanopsia present [Facial Sensation Decrease - V2 Right Only] : decreased over the side of the nose, infraorbital area, and upper lip [Flattening Of Nasolabial Fold On The Right] : flattening of the nasolabial fold [Coordination - Dysmetria Impaired Finger-to-Nose Right Only] : present on the ride side [Coordination - Dysmetria Impaired Finger-to-Nose Left Only] : not present on the left side [3+] : Patella left 3+ [FreeTextEntry4] : Mild loss of fluency, able to name most objects, repetition and comprehension intact [FreeTextEntry6] : Increased tone in RUE>RLE. RUE 2/5, RLE 3/5 LUE/LLE 5/5 [FreeTextEntry7] : decreased light touch in RUE and RLE

## 2024-01-26 ENCOUNTER — RESULT REVIEW (OUTPATIENT)
Age: 49
End: 2024-01-26

## 2024-01-26 ENCOUNTER — OUTPATIENT (OUTPATIENT)
Dept: OUTPATIENT SERVICES | Facility: HOSPITAL | Age: 49
LOS: 1 days | End: 2024-01-26
Payer: COMMERCIAL

## 2024-01-26 DIAGNOSIS — I62.9 NONTRAUMATIC INTRACRANIAL HEMORRHAGE, UNSPECIFIED: ICD-10-CM

## 2024-01-26 PROCEDURE — 70544 MR ANGIOGRAPHY HEAD W/O DYE: CPT | Mod: 26,59

## 2024-01-26 PROCEDURE — 70551 MRI BRAIN STEM W/O DYE: CPT | Mod: 26

## 2024-01-26 PROCEDURE — 70549 MR ANGIOGRAPH NECK W/O&W/DYE: CPT

## 2024-01-26 PROCEDURE — 70551 MRI BRAIN STEM W/O DYE: CPT

## 2024-01-26 PROCEDURE — 70549 MR ANGIOGRAPH NECK W/O&W/DYE: CPT | Mod: 26

## 2024-01-26 PROCEDURE — A9579: CPT

## 2024-01-26 PROCEDURE — 70544 MR ANGIOGRAPHY HEAD W/O DYE: CPT

## 2024-01-27 DIAGNOSIS — I62.9 NONTRAUMATIC INTRACRANIAL HEMORRHAGE, UNSPECIFIED: ICD-10-CM

## 2024-02-19 NOTE — ASSESSMENT
He lives in a group home and has caregiver  Continue supportive care   [FreeTextEntry1] : 1.  Left hemispheric stroke from ICH.  No new stroke symptoms and BP is well controlled.\par 2.  Mild aphasia secondary to #1.  She has improved with speech therapy though some loss of fluency.  I did not find generalized cognitive impairment on testing in office.  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  She should be able to return to work but will benefit from a para.  I wrote a letter in support of this and also gave referral to speech therapist to continue to optimize language function.\par 3.  Botox for RUE spasticity.  She declines botox in RLE.\par 4.  Return in 6 months.

## 2024-05-09 ENCOUNTER — NON-APPOINTMENT (OUTPATIENT)
Age: 49
End: 2024-05-09

## 2024-05-10 ENCOUNTER — APPOINTMENT (OUTPATIENT)
Dept: NEUROLOGY | Facility: CLINIC | Age: 49
End: 2024-05-10
Payer: COMMERCIAL

## 2024-05-10 VITALS
SYSTOLIC BLOOD PRESSURE: 135 MMHG | HEART RATE: 63 BPM | TEMPERATURE: 98.6 F | DIASTOLIC BLOOD PRESSURE: 83 MMHG | OXYGEN SATURATION: 100 % | WEIGHT: 225 LBS | HEIGHT: 61 IN | BODY MASS INDEX: 42.48 KG/M2

## 2024-05-10 DIAGNOSIS — I62.9 NONTRAUMATIC INTRACRANIAL HEMORRHAGE, UNSPECIFIED: ICD-10-CM

## 2024-05-10 PROCEDURE — 99214 OFFICE O/P EST MOD 30 MIN: CPT

## 2024-05-10 NOTE — HISTORY OF PRESENT ILLNESS
[FreeTextEntry1] : Interval History: Pt presents today for follow p. States that she is a little more tired and unsure if baclofen is helping. Botox appt scheduled for July. Denies recent falls. Speech and mobility are slowly improving although she is afraid of falling again.   HPI:  Pt is a 46 yo F with PMHx of HTN, DAPNHE, h/o RLE DVT, covid complicated by trach s/p reversal L BG IPH with aphasia and right hemiplegia 02/2020 (at [facility]), who presents today with her mother for follow up. Pt endorses doing well overall. 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 She fell last April, sustained a right humerus fracture. Now uses a cane when ambulating outside. She returned to work as a teacher. Mild dysarthria and occasional word finding difficulty, denies dysphagia. She has not had botox in over a year since Dr. Molina left. Endorses increased spasticity in her RUE since her fracture. Denies falls.  8/2023: Pt developed rash with baclofen. Discussed slowly tapering off (decrease to 5mg once daily x 1 week then d/c). She has an appt for botox for spasticity in 02/2024.   12/2023: Pt presents today with her mother for f/u. She had another fall this year where she strained her right leg. Fell/tripped in the bathroom, denies LOC or head trauma. She has been working with PT and RLE is getting stronger. She continued to take baclofen 5mg daily, no rashes or side effects, but is usure how helpful it is. Denies RLE "buckling" or "giving out" on her. has an upcoming appt with neuro-ophthalmology.

## 2024-05-10 NOTE — REVIEW OF SYSTEMS
[As Noted in HPI] : as noted in HPI [Eyesight Problems] : eyesight problems [Negative] : Cardiovascular

## 2024-05-10 NOTE — PHYSICAL EXAM
[General Appearance - Alert] : alert [General Appearance - In No Acute Distress] : in no acute distress [General Appearance - Well Nourished] : well nourished [General Appearance - Well Developed] : well developed [Oriented To Time, Place, And Person] : oriented to person, place, and time [Affect] : the affect was normal [Person] : oriented to person [Place] : oriented to place [Time] : oriented to time [Cranial Nerves Oculomotor (III)] : extraocular motion intact [Cranial Nerves Vestibulocochlear (VIII)] : hearing was intact bilaterally [Cranial Nerves Hypoglossal (XII)] : there was no tongue deviation with protrusion [Hemianopsia Homonymous Right] : right homonymous hemianopsia present [Quadrantanopsia] : quadrantanopsia present [Facial Sensation Decrease - V2 Right Only] : decreased over the side of the nose, infraorbital area, and upper lip [Flattening Of Nasolabial Fold On The Right] : flattening of the nasolabial fold [Coordination - Dysmetria Impaired Finger-to-Nose Right Only] : present on the ride side [Coordination - Dysmetria Impaired Finger-to-Nose Left Only] : not present on the left side [2+] : Patella left 2+ [FreeTextEntry4] : Mild loss of fluency, able to name most objects, repetition and comprehension intact [FreeTextEntry6] : Increased tone in RUE>RLE. RUE 2/5, RLE 3/5 LUE/LLE 5/5 [FreeTextEntry7] : decreased light touch in RUE and RLE

## 2024-05-10 NOTE — DISCUSSION/SUMMARY
[FreeTextEntry1] : Pt is a 49 yo F with PMHx of HTN, DAPHNE, h/o RLE DVT, covid complicated by trach s/p reversal L BG IPH with aphasia and right hemiplegia 02/2020 (at [facility]), who presents today with her mother for follow up.  # L BG IPH with residual right spastic hemiplegia and dysarthria: 02/2020, reviewed outside records; likely 2/2 HTN and covid infection. NIHSS 6. mRS 1 (has not returned to driving, otherwise able to do all ADL's independently and back to work). - Continue HTN optimization, SBP goal <140 - CUS 50-69% b/l and retrograde flow in L VA. However no significant flow limiting stenosis on MRA head/neck - MRI brain completed, no interval events noted - slowly titrate off baclofen - Botox referral to Dr. Gilbert   RTC in 6 mo. [Goals and Counseling] : I have reviewed the goals of stroke risk factor modification. I counseled the patient on measures to reduce stroke risk, including the importance of medication compliance, risk factor control, exercise, healthy diet and avoidance of smoking. I reviewed stroke warning signs and symptoms and appropriate actions to take if such occur.

## 2024-07-01 ENCOUNTER — APPOINTMENT (OUTPATIENT)
Dept: NEUROLOGY | Facility: CLINIC | Age: 49
End: 2024-07-01
Payer: COMMERCIAL

## 2024-07-01 PROCEDURE — 64642 CHEMODENERV 1 EXTREMITY 1-4: CPT

## 2024-07-01 PROCEDURE — 95874 GUIDE NERV DESTR NEEDLE EMG: CPT

## 2024-07-01 PROCEDURE — 99212 OFFICE O/P EST SF 10 MIN: CPT | Mod: 25

## 2024-11-14 ENCOUNTER — APPOINTMENT (OUTPATIENT)
Dept: NEUROLOGY | Facility: CLINIC | Age: 49
End: 2024-11-14

## 2024-11-14 VITALS
BODY MASS INDEX: 48.15 KG/M2 | SYSTOLIC BLOOD PRESSURE: 114 MMHG | DIASTOLIC BLOOD PRESSURE: 76 MMHG | HEART RATE: 69 BPM | OXYGEN SATURATION: 97 % | WEIGHT: 255 LBS | HEIGHT: 61 IN

## 2024-11-14 PROCEDURE — 95873 GUIDE NERV DESTR ELEC STIM: CPT

## 2024-11-14 PROCEDURE — 99213 OFFICE O/P EST LOW 20 MIN: CPT | Mod: 25

## 2024-11-14 PROCEDURE — 64642 CHEMODENERV 1 EXTREMITY 1-4: CPT

## 2024-11-14 RX ADMIN — ONABOTULINUMTOXINA 1.75 UNIT: 100 INJECTION, POWDER, LYOPHILIZED, FOR SOLUTION INTRADERMAL; INTRAMUSCULAR at 00:00

## 2025-03-24 ENCOUNTER — APPOINTMENT (OUTPATIENT)
Dept: NEUROLOGY | Facility: CLINIC | Age: 50
End: 2025-03-24

## 2025-04-04 ENCOUNTER — RX RENEWAL (OUTPATIENT)
Age: 50
End: 2025-04-04